# Patient Record
Sex: MALE | HISPANIC OR LATINO | Employment: STUDENT | URBAN - METROPOLITAN AREA
[De-identification: names, ages, dates, MRNs, and addresses within clinical notes are randomized per-mention and may not be internally consistent; named-entity substitution may affect disease eponyms.]

---
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
## 2019-09-19 PROBLEM — Z98.890 S/P ACL RECONSTRUCTION: Status: ACTIVE | Noted: 2019-09-19

## 2019-09-19 PROBLEM — Z47.89 ORTHOPEDIC AFTERCARE: Status: ACTIVE | Noted: 2019-09-19

## 2025-01-06 ENCOUNTER — OFFICE VISIT (OUTPATIENT)
Dept: URGENT CARE | Facility: CLINIC | Age: 25
End: 2025-01-06
Payer: COMMERCIAL

## 2025-01-06 VITALS
TEMPERATURE: 98.1 F | OXYGEN SATURATION: 97 % | HEART RATE: 109 BPM | DIASTOLIC BLOOD PRESSURE: 74 MMHG | WEIGHT: 210 LBS | SYSTOLIC BLOOD PRESSURE: 128 MMHG | HEIGHT: 69 IN | RESPIRATION RATE: 20 BRPM | BODY MASS INDEX: 31.1 KG/M2

## 2025-01-06 DIAGNOSIS — J02.9 PHARYNGITIS, UNSPECIFIED ETIOLOGY: ICD-10-CM

## 2025-01-06 LAB — S PYO DNA SPEC NAA+PROBE: NOT DETECTED

## 2025-01-06 PROCEDURE — 3078F DIAST BP <80 MM HG: CPT | Performed by: NURSE PRACTITIONER

## 2025-01-06 PROCEDURE — 99203 OFFICE O/P NEW LOW 30 MIN: CPT | Performed by: NURSE PRACTITIONER

## 2025-01-06 PROCEDURE — 3074F SYST BP LT 130 MM HG: CPT | Performed by: NURSE PRACTITIONER

## 2025-01-06 PROCEDURE — 87651 STREP A DNA AMP PROBE: CPT | Performed by: NURSE PRACTITIONER

## 2025-01-06 RX ORDER — LIDOCAINE HYDROCHLORIDE 20 MG/ML
15 SOLUTION OROPHARYNGEAL
Qty: 100 ML | Refills: 0 | Status: SHIPPED | OUTPATIENT
Start: 2025-01-06

## 2025-01-06 RX ORDER — IBUPROFEN 800 MG/1
800 TABLET, FILM COATED ORAL EVERY 8 HOURS PRN
Qty: 30 TABLET | Refills: 0 | Status: SHIPPED | OUTPATIENT
Start: 2025-01-06

## 2025-01-06 ASSESSMENT — VISUAL ACUITY: OU: 1

## 2025-01-06 NOTE — LETTER
January 6, 2025         Patient: Saeed Carrillo   YOB: 2000   Date of Visit: 1/6/2025           To Whom it May Concern:    Saeed Carrillo was seen in my clinic on 1/6/2025 due to illness. Due to medical necessity, please excuse patient from work for the next 2 days as needed.     If you have any questions or concerns, please don't hesitate to call.        Sincerely,         KENNETH Cardona.  Electronically Signed

## 2025-01-07 NOTE — PROGRESS NOTES
Subjective:     Saeed Carrillo is a 24 y.o. male who presents for Sore Throat (X2days )       Pharyngitis   This is a new problem. Pertinent negatives include no coughing.     Ambient listening software (Unruly Â®) used during this encounter with the consent of the patient. The following text is AI generated:    History of Present Illness  The patient presents with a sore throat since Saturday night, progressively worsening. Pain during swallowing, managed with Tylenol. Reports chills and mild ear pain, no cough. Not concerned about COVID-19 or influenza. No missed workdays.    Sore Throat and Associated Symptoms  - Onset: Since Saturday night.  - Location: Throat, with mild ear pain.  - Duration: Progressively worsening since onset.  - Character: Sore throat, pain during swallowing, chills, mild ear pain.  - Alleviating Factors: Managed with Tylenol.  - Severity: Not severe enough to miss workdays.    MEDICATIONS  - Tylenol    Review of Systems   Constitutional:  Positive for chills.   HENT:  Positive for sore throat.    Respiratory:  Negative for cough.    All other systems reviewed and are negative.    Refer to HPI for additional details.    During this visit, appropriate PPE was worn, and hand hygiene was performed.    PMH:  has no past medical history of Acute nasopharyngitis, Acute pain, Anesthesia, Anginal syndrome ([facility]), Arrhythmia, Arthritis, Asthma, At risk for sleep apnea, Blood clotting disorder ([facility]), Bowel habit changes, Breath shortness, Bronchitis, Cancer ([facility]), Carcinoma in situ of respiratory system, Cataract, Chronic pain, Congestive heart failure ([facility]), Continuous ambulatory peritoneal dialysis status ([facility]), COPD (chronic obstructive pulmonary disease) ([facility]), Coughing blood, Dental disorder, Diabetes ([facility]), Dialysis patient ([facility]), Disorder of thyroid, Emphysema of lung ([facility]), Glaucoma, Gynecological disorder, Heart burn, Heart murmur, Heart valve disease, Hemorrhagic disorder ([facility]), Hepatitis  "A, Hepatitis B, Hepatitis C, Hiatus hernia syndrome, High cholesterol, Hypertension, Indigestion, Infectious disease, Jaundice, Myocardial infarct (HCC), Pacemaker, Pneumonia, Pregnant, Psychiatric problem, Renal disorder, Rheumatic fever, Seizure (HCC), Sleep apnea, Snoring, Stroke (HCC), Tuberculosis, Urinary bladder disorder, or Urinary incontinence.    MEDS:   Current Outpatient Medications:     lidocaine (XYLOCAINE) 2 % Solution, Take 15 mL by mouth every 3 hours as needed for Throat/Mouth Pain. Swish/gargle well, then spit out, Disp: 100 mL, Rfl: 0    ibuprofen (MOTRIN) 800 MG Tab, Take 1 Tablet by mouth every 8 hours as needed for Moderate Pain or Inflammation., Disp: 30 Tablet, Rfl: 0    ALLERGIES: No Known Allergies  SURGHX:   Past Surgical History:   Procedure Laterality Date    PB KNEE SCOPE,AID ANT CRUCIATE REPAIR Right 9/10/2019    Procedure: RIGHT KNEE ARTHROSCOPY, ANTERIOR CRUCIATE LIGAMENT RECONSTRUCTION WITH HAMSTRING TENDON AUTOGRAFT, MEDIAL AND LATERAL MENISCAL REPAIR;  Surgeon: Raudel Jackson M.D.;  Location: SURGERY [facility];  Service: Orthopedics    OTHER ORTHOPEDIC SURGERY      Left ACL     SOCHX:  reports that he has never smoked. He has never used smokeless tobacco. He reports current alcohol use. He reports that he does not use drugs.    FH: Per HPI as applicable/pertinent.      Objective:     /74   Pulse (!) 109   Temp 36.7 °C (98.1 °F) (Temporal)   Resp 20   Ht 1.753 m (5' 9\")   Wt 95.3 kg (210 lb)   SpO2 97%   BMI 31.01 kg/m²     Physical Exam  Nursing note reviewed.   Constitutional:       General: He is not in acute distress.     Appearance: He is well-developed. He is not ill-appearing or toxic-appearing.   HENT:      Right Ear: Tympanic membrane normal.      Left Ear: Tympanic membrane normal.      Mouth/Throat:      Mouth: Mucous membranes are moist.      Pharynx: Pharyngeal swelling and posterior oropharyngeal erythema present.   Eyes:      General: Vision grossly " intact.   Neck:      Trachea: Phonation normal.   Cardiovascular:      Rate and Rhythm: Tachycardia present.   Pulmonary:      Effort: Pulmonary effort is normal. No respiratory distress.   Musculoskeletal:         General: No deformity. Normal range of motion.      Cervical back: Neck supple.   Lymphadenopathy:      Cervical: No cervical adenopathy.   Skin:     General: Skin is warm and dry.      Coloration: Skin is not pale.   Neurological:      Mental Status: He is alert and oriented to person, place, and time.      Motor: No weakness.   Psychiatric:         Behavior: Behavior normal. Behavior is cooperative.     POCT Cepheid Group A Strep by PCR: negative      Assessment/Plan:     1. Pharyngitis, unspecified etiology  - POCT CEPHEID GROUP A STREP - PCR  - lidocaine (XYLOCAINE) 2 % Solution; Take 15 mL by mouth every 3 hours as needed for Throat/Mouth Pain. Swish/gargle well, then spit out  Dispense: 100 mL; Refill: 0  - ibuprofen (MOTRIN) 800 MG Tab; Take 1 Tablet by mouth every 8 hours as needed for Moderate Pain or Inflammation.  Dispense: 30 Tablet; Refill: 0    Assessment & Plan  Pharyngitis  - Symptoms suggest possible streptococcal infection  - Throat swab for strep testing  - Will contact with results  - Work note for 2 days  - Prescription for lidocaine liquid to gargle and ibuprofen 800 mg for inflammation  - Continue Tylenol  - If strep test positive, prescribe antibiotics    PCR swab negative. Discussed with patient on phone.    Differential diagnosis, natural history, supportive care, over-the-counter symptom management per 's instructions, close monitoring, and indications for immediate follow-up discussed.     All questions answered. Patient agrees with the plan of care.    Work note provided.

## 2025-01-09 ENCOUNTER — OFFICE VISIT (OUTPATIENT)
Dept: URGENT CARE | Facility: CLINIC | Age: 25
End: 2025-01-09
Payer: COMMERCIAL

## 2025-01-09 VITALS
TEMPERATURE: 97.8 F | RESPIRATION RATE: 18 BRPM | HEART RATE: 85 BPM | DIASTOLIC BLOOD PRESSURE: 70 MMHG | BODY MASS INDEX: 34.64 KG/M2 | SYSTOLIC BLOOD PRESSURE: 110 MMHG | WEIGHT: 233.9 LBS | HEIGHT: 69 IN | OXYGEN SATURATION: 97 %

## 2025-01-09 DIAGNOSIS — J02.9 PHARYNGITIS, UNSPECIFIED ETIOLOGY: ICD-10-CM

## 2025-01-09 DIAGNOSIS — K12.1 MOUTH ULCERS: ICD-10-CM

## 2025-01-09 PROCEDURE — 3078F DIAST BP <80 MM HG: CPT | Performed by: PHYSICIAN ASSISTANT

## 2025-01-09 PROCEDURE — 99213 OFFICE O/P EST LOW 20 MIN: CPT | Performed by: PHYSICIAN ASSISTANT

## 2025-01-09 PROCEDURE — 3074F SYST BP LT 130 MM HG: CPT | Performed by: PHYSICIAN ASSISTANT

## 2025-01-09 ASSESSMENT — ENCOUNTER SYMPTOMS
EYE REDNESS: 0
NECK PAIN: 0
DIARRHEA: 0
SORE THROAT: 1
FEVER: 0
COUGH: 0
VOMITING: 0
MYALGIAS: 0
EYE DISCHARGE: 0

## 2025-01-09 NOTE — LETTER
January 9, 2025         Patient: Saeed Carrillo   YOB: 2000   Date of Visit: 1/9/2025           To Whom it May Concern:    Saeed Carrillo was seen in my clinic on 1/9/2025. Please excuse this patient from work due to recent illness as he has been ill the last several days. He may return tomorrow if symptoms have improved.     If you have any questions or concerns, please don't hesitate to call.        Sincerely,           Alonso Gutierrez P.A.-C.  Electronically Signed

## 2025-01-09 NOTE — PROGRESS NOTES
"Subjective     Saeed Carrillo is a 24 y.o. male who presents with Follow-Up (X3days patient not feeling better/pt needs dr note to go back to work )            HPI  Patient is a 24-year-old male presents to urgent care with continued sore throat.  Patient was originally evaluated on January 6 for similar symptoms of which at that time patient's strep was negative and written ibuprofen along with lidocaine for suspected viral pharyngitis.  Patient admits symptoms were not significantly improving for 2 days however today patient is feeling notably better.  He trialed several home remedies which have been improving symptoms.  Continues to deny any fevers, cough, shortness of breath.  Unfortunately patient has been out of work due to recent illness and is requesting a work note today.    Review of Systems   Constitutional:  Positive for malaise/fatigue. Negative for fever.   HENT:  Positive for sore throat.    Eyes:  Negative for discharge and redness.   Respiratory:  Negative for cough.    Gastrointestinal:  Negative for diarrhea and vomiting.   Musculoskeletal:  Negative for myalgias and neck pain.              Objective     /70   Pulse 85   Temp 36.6 °C (97.8 °F) (Temporal)   Resp 18   Ht 1.753 m (5' 9\")   Wt 106 kg (233 lb 14.4 oz)   SpO2 97%   BMI 34.54 kg/m²    PMH:  has no past medical history of Acute nasopharyngitis, Acute pain, Anesthesia, Anginal syndrome ([facility]), Arrhythmia, Arthritis, Asthma, At risk for sleep apnea, Blood clotting disorder ([facility]), Bowel habit changes, Breath shortness, Bronchitis, Cancer ([facility]), Carcinoma in situ of respiratory system, Cataract, Chronic pain, Congestive heart failure ([facility]), Continuous ambulatory peritoneal dialysis status ([facility]), COPD (chronic obstructive pulmonary disease) ([facility]), Coughing blood, Dental disorder, Diabetes ([facility]), Dialysis patient ([facility]), Disorder of thyroid, Emphysema of lung ([facility]), Glaucoma, Gynecological disorder, Heart burn, Heart murmur, Heart " valve disease, Hemorrhagic disorder (HCC), Hepatitis A, Hepatitis B, Hepatitis C, Hiatus hernia syndrome, High cholesterol, Hypertension, Indigestion, Infectious disease, Jaundice, Myocardial infarct (HCC), Pacemaker, Pneumonia, Pregnant, Psychiatric problem, Renal disorder, Rheumatic fever, Seizure (HCC), Sleep apnea, Snoring, Stroke (HCC), Tuberculosis, Urinary bladder disorder, or Urinary incontinence.  MEDS: Reviewed .   ALLERGIES: No Known Allergies  SURGHX:   Past Surgical History:   Procedure Laterality Date    PB KNEE SCOPE,AID ANT CRUCIATE REPAIR Right 9/10/2019    Procedure: RIGHT KNEE ARTHROSCOPY, ANTERIOR CRUCIATE LIGAMENT RECONSTRUCTION WITH HAMSTRING TENDON AUTOGRAFT, MEDIAL AND LATERAL MENISCAL REPAIR;  Surgeon: Raudel Jackson M.D.;  Location: SURGERY [facility];  Service: Orthopedics    OTHER ORTHOPEDIC SURGERY      Left ACL     SOCHX:  reports that he has never smoked. He has never used smokeless tobacco. He reports current alcohol use. He reports that he does not use drugs.  FH: Family history was reviewed, no pertinent findings to report    Physical Exam  Vitals reviewed.   Constitutional:       General: He is not in acute distress.     Appearance: He is well-developed.   HENT:      Head: Normocephalic and atraumatic.      Right Ear: External ear normal.      Left Ear: External ear normal.      Mouth/Throat:      Mouth: Mucous membranes are moist.      Comments: Superficial ulcerative lesions to the posterior oral pharynx.  Mild erythema without exudate.  Uvula is midline.  Without peritonsillar edema.  Voice is normal.  Eyes:      Conjunctiva/sclera: Conjunctivae normal.      Pupils: Pupils are equal, round, and reactive to light.   Neck:      Trachea: No tracheal deviation.   Cardiovascular:      Rate and Rhythm: Normal rate.   Pulmonary:      Effort: Pulmonary effort is normal.   Musculoskeletal:      Cervical back: Normal range of motion and neck supple.      Comments: Moves all extremities    Skin:     General: Skin is warm.      Findings: No rash.      Comments: No rash to area exposed during the visit today.    Neurological:      Mental Status: He is alert and oriented to person, place, and time.      Coordination: Coordination normal.   Psychiatric:         Mood and Affect: Mood normal.         Behavior: Behavior normal.                             Assessment & Plan        Assessment & Plan  Pharyngitis, unspecified etiology         Mouth ulcers                     MDM/ Plan /Discussion:      Work note provided.  Patient has had notable improvement.  Appears that patient is with now ulcerative lesions to the throat and discussed normal sequela of herpangina versus coxsackie versus viral etiology of ulcerative changes to the oral mucosa.    Differential diagnosis, natural history, supportive care, and indications for immediate follow-up discussed. Side effects of OTC or prescribed medications discussed.      Follow-up as needed if symptoms worsen or fail to improve to PCP, Urgent care or Emergency Room.     I have personally reviewed prior external notes and test results pertinent to today's visit.  I have independently reviewed and interpreted all diagnostics ordered during this urgent care acute visit.   Discussed management options (risks,benefits, and alternatives to treatment).    The patient and/or guardian demonstrated a good understanding and agreed with the treatment plan. And all questions were answered.  Please note that this dictation was created using voice recognition software. I have made a reasonable attempt to correct obvious errors, but I expect that there are errors of grammar and possibly content that I did not discover before finalizing the note.

## 2025-01-10 ASSESSMENT — ENCOUNTER SYMPTOMS
CHILLS: 1
COUGH: 0
SORE THROAT: 1